# Patient Record
Sex: FEMALE | Race: BLACK OR AFRICAN AMERICAN | Employment: FULL TIME | URBAN - METROPOLITAN AREA
[De-identification: names, ages, dates, MRNs, and addresses within clinical notes are randomized per-mention and may not be internally consistent; named-entity substitution may affect disease eponyms.]

---

## 2019-07-08 DIAGNOSIS — R30.0 DYSURIA: ICD-10-CM

## 2019-07-08 DIAGNOSIS — R82.90 NONSPECIFIC FINDING ON EXAMINATION OF URINE: Primary | ICD-10-CM

## 2019-07-08 DIAGNOSIS — R30.0 DYSURIA: Primary | ICD-10-CM

## 2019-07-08 LAB
ALBUMIN UR-MCNC: 30 MG/DL
APPEARANCE UR: ABNORMAL
BACTERIA #/AREA URNS HPF: ABNORMAL /HPF
BILIRUB UR QL STRIP: NEGATIVE
COLOR UR AUTO: YELLOW
GLUCOSE UR STRIP-MCNC: >=1000 MG/DL
HGB UR QL STRIP: ABNORMAL
KETONES UR STRIP-MCNC: NEGATIVE MG/DL
LEUKOCYTE ESTERASE UR QL STRIP: ABNORMAL
NITRATE UR QL: NEGATIVE
PH UR STRIP: 6 PH (ref 5–7)
RBC #/AREA URNS AUTO: >100 /HPF
SOURCE: ABNORMAL
SP GR UR STRIP: 1.01 (ref 1–1.03)
UROBILINOGEN UR STRIP-ACNC: 0.2 EU/DL (ref 0.2–1)
WBC #/AREA URNS AUTO: >100 /HPF

## 2019-07-08 PROCEDURE — 87088 URINE BACTERIA CULTURE: CPT | Performed by: UROLOGY

## 2019-07-08 PROCEDURE — 81001 URINALYSIS AUTO W/SCOPE: CPT | Performed by: UROLOGY

## 2019-07-08 PROCEDURE — 87186 SC STD MICRODIL/AGAR DIL: CPT | Performed by: UROLOGY

## 2019-07-08 PROCEDURE — 87086 URINE CULTURE/COLONY COUNT: CPT | Performed by: UROLOGY

## 2019-07-10 LAB
BACTERIA SPEC CULT: ABNORMAL
SPECIMEN SOURCE: ABNORMAL

## 2019-07-31 ENCOUNTER — OFFICE VISIT (OUTPATIENT)
Dept: ENDOCRINOLOGY | Facility: CLINIC | Age: 39
End: 2019-07-31
Payer: COMMERCIAL

## 2019-07-31 VITALS
WEIGHT: 210 LBS | BODY MASS INDEX: 31.1 KG/M2 | HEIGHT: 69 IN | DIASTOLIC BLOOD PRESSURE: 98 MMHG | SYSTOLIC BLOOD PRESSURE: 142 MMHG

## 2019-07-31 DIAGNOSIS — E66.811 CLASS 1 OBESITY DUE TO EXCESS CALORIES WITH SERIOUS COMORBIDITY AND BODY MASS INDEX (BMI) OF 31.0 TO 31.9 IN ADULT: ICD-10-CM

## 2019-07-31 DIAGNOSIS — Z79.4 TYPE 2 DIABETES MELLITUS WITHOUT COMPLICATION, WITH LONG-TERM CURRENT USE OF INSULIN (H): Primary | ICD-10-CM

## 2019-07-31 DIAGNOSIS — E66.09 CLASS 1 OBESITY DUE TO EXCESS CALORIES WITH SERIOUS COMORBIDITY AND BODY MASS INDEX (BMI) OF 31.0 TO 31.9 IN ADULT: ICD-10-CM

## 2019-07-31 DIAGNOSIS — E11.9 TYPE 2 DIABETES MELLITUS WITHOUT COMPLICATION, WITH LONG-TERM CURRENT USE OF INSULIN (H): Primary | ICD-10-CM

## 2019-07-31 LAB
ALT SERPL W P-5'-P-CCNC: 19 U/L (ref 0–50)
ANION GAP SERPL CALCULATED.3IONS-SCNC: 8 MMOL/L (ref 3–14)
BUN SERPL-MCNC: 10 MG/DL (ref 7–30)
CALCIUM SERPL-MCNC: 9.2 MG/DL (ref 8.5–10.1)
CHLORIDE SERPL-SCNC: 99 MMOL/L (ref 94–109)
CO2 SERPL-SCNC: 25 MMOL/L (ref 20–32)
CREAT SERPL-MCNC: 0.84 MG/DL (ref 0.52–1.04)
GFR SERPL CREATININE-BSD FRML MDRD: 86 ML/MIN/{1.73_M2}
GLUCOSE SERPL-MCNC: 243 MG/DL (ref 70–99)
HBA1C MFR BLD: 11.2 % (ref 0–5.6)
POTASSIUM SERPL-SCNC: 3.7 MMOL/L (ref 3.4–5.3)
SODIUM SERPL-SCNC: 132 MMOL/L (ref 133–144)
TSH SERPL DL<=0.005 MIU/L-ACNC: 1.34 MU/L (ref 0.4–4)

## 2019-07-31 PROCEDURE — 84443 ASSAY THYROID STIM HORMONE: CPT | Performed by: INTERNAL MEDICINE

## 2019-07-31 PROCEDURE — 80048 BASIC METABOLIC PNL TOTAL CA: CPT | Performed by: INTERNAL MEDICINE

## 2019-07-31 PROCEDURE — 84681 ASSAY OF C-PEPTIDE: CPT | Performed by: INTERNAL MEDICINE

## 2019-07-31 PROCEDURE — 99204 OFFICE O/P NEW MOD 45 MIN: CPT | Performed by: INTERNAL MEDICINE

## 2019-07-31 PROCEDURE — 36415 COLL VENOUS BLD VENIPUNCTURE: CPT | Performed by: INTERNAL MEDICINE

## 2019-07-31 PROCEDURE — 83036 HEMOGLOBIN GLYCOSYLATED A1C: CPT | Performed by: INTERNAL MEDICINE

## 2019-07-31 PROCEDURE — 84460 ALANINE AMINO (ALT) (SGPT): CPT | Performed by: INTERNAL MEDICINE

## 2019-07-31 PROCEDURE — 82043 UR ALBUMIN QUANTITATIVE: CPT | Performed by: INTERNAL MEDICINE

## 2019-07-31 RX ORDER — BUDESONIDE AND FORMOTEROL FUMARATE DIHYDRATE 160; 4.5 UG/1; UG/1
AEROSOL RESPIRATORY (INHALATION)
COMMUNITY
Start: 2019-05-20

## 2019-07-31 RX ORDER — INSULIN ASPART 100 [IU]/ML
INJECTION, SOLUTION INTRAVENOUS; SUBCUTANEOUS
COMMUNITY
Start: 2019-07-19

## 2019-07-31 RX ORDER — ALBUTEROL SULFATE 90 UG/1
AEROSOL, METERED RESPIRATORY (INHALATION)
COMMUNITY
Start: 2019-05-10

## 2019-07-31 RX ORDER — INSULIN GLARGINE 100 [IU]/ML
60 INJECTION, SOLUTION SUBCUTANEOUS
COMMUNITY
Start: 2019-05-08 | End: 2020-04-06

## 2019-07-31 RX ORDER — TRIAMTERENE/HYDROCHLOROTHIAZID 37.5-25 MG
TABLET ORAL
COMMUNITY
Start: 2019-04-15 | End: 2019-11-12

## 2019-07-31 ASSESSMENT — MIFFLIN-ST. JEOR: SCORE: 1686.93

## 2019-07-31 NOTE — PATIENT INSTRUCTIONS
Continue diabetes medications as:   Metformin  1000 mg morning and evening   Novolog Flexpen 12U with regular meals, 15U with large carb meals      sscale 2U per 50 mg/dl 150 mg/dl   Lantus Solostar 64U morning and evening      Trulicity   Resume weekly dose routine      Monitor for GI symptoms, skin rash/hives    Test blood glucose levels more often  Plan to see the [facility] Diabetes Educator   Referral placed   Call [phone] to make an appointment   Plan to wear diagnostic Shellie sensor for 1 week   Consider purchase of own Dexcom vs Shellie sensor    Future followup endocrinology evaluation with Dr. Bryant in 9/2019    I will be reassigned from the [facility] to the [facility] starting in August 2019.  I will no longer be seeing patients in [place] after August 1, 2019.  My work schedule:  [facility] -Monday, Tuesday, Friday and [facility]- Wednesdays    A new endocrinologist (Dr. Bryant) started working at [facility] and [facility], starting in late July 2019.  You may schedule future appointments with the new endocrinologist or travel to the [facility] or [facility] to see me for evaluations.   Appointment scheduling:  [facility]    [phone]  [facility]):  [phone]  [facility]   [phone]  [facility]): [phone]

## 2019-07-31 NOTE — PROGRESS NOTES
Name: Laura Sexton is a 38 year old woman, self referred for evaluation of     Chief Complaint   Patient presents with     Diabetes     HPI:  Recent issues:  Here for evaluation of diabetes.  She works at  [employer], learned of my endocrinology practice here  Reviewed medical history from patient and [facility] chart record        . Diagnosed with GDM, age 14  During pregnancy, diet management without insulin  Delivered 1994, daughter birthweight 7#11oz    . Diagnosed with diabetes mellitus  Began insulin treatment, has taken since  Has also used diabetes pills as metformin, also glimeparide   Recalls having low BG on glimeparide in the past  Subsequent 5 more pregnancies though 2 , daughter [id number], son born 2005  Insulin meds have included Humalog, Novolog, Lantus    Had previously seen Dr. Franky Engel/C  Treatment with Lantus daily and mealtime Novolog, metformin  Tried Bidureon- had hives, may have tried Trulicity- hives    Has not worn a CGMS sensor in the past  Current DM medications:   Metformin  1000 mg morning and evening   Novolog Flexpen 10 units with meals      sscale 2U per 50 mg/dl 150 mg/dl   Lantus Solostar 60 units morning and evening     Blood glucose (BG) meter Accu-Check Nikky Plus   Tests 1-2x/day   Recent BG trends high 200's vs low 300's    Previous [facility] labs include:        Previous labs at PCP clinic in 2019. hgbA1c 15.3%  2019. hgbA1c 11%    Recent  labs include:  Lab Results   Component Value Date    A1C 6.4 (H) 10/07/2005    POTASSIUM 3.9 10/07/2005     (H) 10/07/2005    CHOL 206 (H) 10/07/2005    TRIG 133 10/07/2005    HDL 38 (L) 10/07/2005     (H) 10/07/2005    UCRR 173 2005    MICROL 12 2005    UMALCR 7.11 2005    TSH 0.98 10/07/2005     Last eye exam 3/2019, no DR per patient but early signs of glaucoma  DM Complications:   Nephropathy    Elevated urine micral 2019 when not taking her insulin and high BG  trends    Recent symptoms:   Some fatigue, mild weight gain   Minimal menstrual cycles with IUD in place  Other chronic illnesses include:   Hypertension:   Takes Maxide med, followed by PCP   Asthma:    Takes Ventolin, Symbicort meds, followed by PCP   Irreg menses:  Has Mirena IUD, followed by PCP or GYN      Single, lives with 14 yr old son in [place]  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 Zonia Hameed/[facility] for general medicine evaluations.    PMH/PSH:  Past Medical History:   Diagnosis Date     Depression      Hirsutism      Hypertension      Obesity      Type 2 diabetes mellitus (H)      Uncontrolled type 2 diabetes mellitus (H)      No past surgical history on file.    Family Hx:  No family history on file.      Social Hx:  Social History     Socioeconomic History     Marital status: Single     Spouse name: Not on file     Number of children: Not on file     Years of education: Not on file     Highest education level: Not on file   Occupational History     Not on file   Social Needs     Financial resource strain: Not on file     Food insecurity:     Worry: Not on file     Inability: Not on file     Transportation needs:     Medical: Not on file     Non-medical: Not on file   Tobacco Use     Smoking status: Never Smoker     Smokeless tobacco: Never Used   Substance and Sexual Activity     Alcohol use: Not on file     Drug use: Not on file     Sexual activity: Not on file   Lifestyle     Physical activity:     Days per week: Not on file     Minutes per session: Not on file     Stress: Not on file   Relationships     Social connections:     Talks on phone: Not on file     Gets together: Not on file     Attends Congregation service: Not on file     Active member of club or organization: Not on file     Attends meetings of clubs or organizations: Not on file     Relationship status: Not on file     Intimate partner violence:     Fear of current or ex partner: Not on file     Emotionally  "abused: Not on file     Physically abused: Not on file     Forced sexual activity: Not on file   Other Topics Concern     Not on file   Social History Narrative     Not on file          MEDICATIONS:  has a current medication list which includes the following prescription(s): lantus solostar, levonorgestrel, metformin, novolog flexpen, symbicort, triamterene-hctz, and ventolin hfa.    ROS:     ROS: 10 point ROS neg other than the symptoms noted above in the HPI.    GENERAL: mild fatigue and wt gain; denies fevers, chills, night sweats.   HEENT: no dysphagia, odonophagia, diplopia, neck pain  THYROID:  no apparent hyper or hypothyroid symptoms  CV: no chest pain, pressure, palpitations  LUNGS: no SOB, VARELA, cough, wheezing   ABDOMEN: no diarrhea, constipation, abdominal pain  EXTREMITIES: no rashes, ulcers, edema  NEUROLOGY: no headaches, denies changes in vision, tingling, extremitiy numbness   MSK: no muscle aches or pains, weakness  SKIN: no rashes or lesions  : irreg menses with light flow, has IUD  PSYCH:  stable mood, no significant anxiety or depression  ENDOCRINE: no heat or cold intolerance    Physical Exam   VS: BP (!) 142/98   Ht 1.753 m (5' 9\")   Wt 95.3 kg (210 lb)   BMI 31.01 kg/m    GENERAL: AXOX3, NAD, well dressed, answering questions appropriately, appears stated age.  THYROID:  normal gland, no apparent nodules or goiter  HEENT: neck non-tender, no exopthalmous, no proptosis, EOMI  CV: RRR, no rubs, gallops, no murmurs  LUNGS: CTAB, no wheezes, rales, or ronchi  ABDOMEN: mildly obese, soft, nontender, nondistended  EXTREMITIES: no edema, +pedal pulses, no feet skin lesions  NEUROLOGY: CN grossly intact, no tremors  MSK: grossly intact  SKIN: dark skin complexion, long scalp hair; no rashes, no lesions    LABS:    All pertinent notes, labs, and images personally reviewed by me.     A/P:  Encounter Diagnoses   Name Primary?     Type 2 diabetes mellitus without complication, with long-term current " use of insulin (H) Yes     Class 1 obesity due to excess calories with serious comorbidity and body mass index (BMI) of 31.0 to 31.9 in adult        Comments:  Reviewed health history and diabetes issues.  Difficult to assess recent PCP eval and lab testing without records available    Plan:  Discussed general issues with the diabetes diagnosis and management  Reviewed the pathophysiology of T2DM  We discussed the hgbA1c test which reflects previous overall glucose levels or control  Discussed the importance of blood glucose (BG) testing to assess glucose trends  Provided general overview of the diabetes medication options and medication treatment plan.    Recommend:  Continue current Novolog, Lantus, and metformin med use, as noted   Needs higher mealtime base-dose and use of sscale   Raise basal insulin doses also  Continue metformin, retry Trulicity   Reviewed Trulicity med and pen dosing technique   Could also consider switch to Victoza daily (her mother uses this) or Ozempic weekly  Consider change from Lantus BID to Tresiba U200 every day  Test BG levels more often, to allow more accurate Nv dosing and Nv sscale  Goal target BG  mg/dl, or could try 100-175 mg/dl  Advise CDE evaluation, [facility] Referral placed   Wear diagnostic CGMS, then f/u eval to review and adjust MDI plan   Consider acquiring own CGMS- DexcomG6 would be ideal  Need to reassess urine micral ratio, given her history of microalbuminuria  Keep focus on diet, exercise, weight management.  Advise having fasting lipid panel testing and dilated eye examination, at least annually    Laura to follow up with Primary Care provider regarding elevated blood pressure.  Addressed patient questions today    Patient Instructions   Continue diabetes medications as:   Metformin  1000 mg morning and evening   Novolog Flexpen 12U with regular meals, 15U with large carb meals      sscale 2U per 50 mg/dl 150 mg/dl   Lantus Solostar 64U morning and  evening      Trulicity   Resume weekly dose routine      Monitor for GI symptoms, skin rash/hives    Test blood glucose levels more often  Plan to see the [facility] Diabetes Educator   Referral placed   Call [phone] to make an appointment   Plan to wear diagnostic Shellie sensor for 1 week   Consider purchase of own Dexcom vs Shellie sensor    Future followup endocrinology evaluation with Dr. Bryant in 9/2019    I will be reassigned from the [facility] to the [facility] starting in August 2019.  I will no longer be seeing patients in [place] after August 1, 2019.  My work schedule:  [facility] -Monday, Tuesday, Friday and [facility]- Wednesdays    A new endocrinologist (Dr. Bryant) started working at [facility] and [facility], starting in late July 2019.  You may schedule future appointments with the new endocrinologist or travel to the [facility] or [facility] to see me for evaluations.   Appointment scheduling:  [facility]    [phone]  [facility]):  [phone]  [facility]   [phone]  [facility]): [phone]        Labs ordered today:   Orders Placed This Encounter   Procedures     Hemoglobin A1c     ALT     Basic metabolic panel     C-peptide     TSH     Albumin Random Urine Quantitative with Creat Ratio     DIABETES EDUCATOR REFERRAL     Radiology/Consults ordered today: DIABETES EDUCATOR REFERRAL    More than 50% of the time spent with Ms. Sexton on counseling / coordinating her care.  Total appointment time was 50 minutes.    Follow-up:  9/2019 at [facility] or [facility]    Saw Cummings MD  [facility]  CC: Zonia Hameed

## 2019-08-01 LAB
C PEPTIDE SERPL-MCNC: 2.3 NG/ML (ref 0.9–6.9)
CREAT UR-MCNC: 119 MG/DL
MICROALBUMIN UR-MCNC: 35 MG/L
MICROALBUMIN/CREAT UR: 29.16 MG/G CR (ref 0–25)

## 2019-08-23 ENCOUNTER — ALLIED HEALTH/NURSE VISIT (OUTPATIENT)
Dept: EDUCATION SERVICES | Facility: CLINIC | Age: 39
End: 2019-08-23
Payer: COMMERCIAL

## 2019-08-23 DIAGNOSIS — E11.9 DIABETES MELLITUS (H): Primary | ICD-10-CM

## 2019-08-23 PROCEDURE — 95250 CONT GLUC MNTR PHYS/QHP EQP: CPT

## 2019-08-23 PROCEDURE — G0108 DIAB MANAGE TRN  PER INDIV: HCPCS

## 2019-08-23 NOTE — PATIENT INSTRUCTIONS
Trulicity- take 0.75mg weekly for 4 weeks then if you don't have any side effects request to go up to 1.5mg weekly.    1. Plan to wear the LibrePro sensor for 14 days. It is okay to shower, bathe, and swim (up to 3 feet deep for 30 minutes)    2. Continue with your usual diabetes care plan - check blood sugars and take medicines, as prescribed.    3. Keep a log of what you eat and drink, when you take your medications and how much you take, and exercise you do while you are wearing the sensor.    3. Do not cover the sensor with extra adhesive (the small hole in the center of the sensor must remain uncovered)    4. Use a little extra care, especially when getting dressed or exercising, to avoid accidentally loosening or removing the sensor.     5. Remove the sensor if you need to have an MRI or CT scan.     Return the sensor to the [facility] on 8/30/19.    Follow-up appointment: 8/30 and 9/6    [facility] for the [facility] Area:  For Your Diabetes Education and Nutrition Appointments Call:  [phone]   For Diabetes Education or Nutrition Related Questions:   Phone: [phone]  E-mail: [email or web address]  Fax: [phone]   If you need a medication refill please contact your pharmacy. Please allow 3 business days for your refills to be completed.    Instructions for emailing the Diabetes Educators    If you need to communicate a non-urgent message to a Diabetes Educator via email, please send to [email or web address].    Please follow the following email guidelines:    Subject line: Secure: your clinic name (example: Secure: [facility])  In the email please include: First name, middle initial, last name and date of birth.    We will be in touch with you within one (1) business day.

## 2019-08-23 NOTE — PROGRESS NOTES
Diabetes Self-Management Education & Support      Diabetes Self-Management Education & Support - Professional CGM Insertion    SUBJECTIVE/OBJECTIVE  Presents for: Individual review  Accompanied by: Self  Diabetes education in the past 24mo: No  Focus of Visit: CGM  Diabetes type: Type 2  Disease course: Worsening  How confident are you filling out medical forms by yourself:: Extremely  Diabetes management related comments/concerns: My insurance said I need a PA for the personal one.  Transportation concerns: No  Other concerns:: None  Cultural Influences/Ethnic Background:  -AMERICAN    Patient seen today for Professional CGM Insertion:    Professional CGM Insertion  Sensor Type: LibrePro  Lot #: [id number]  Serial #: [id number]  Expiration Date: 09/30/19  Indication(s) for CGM Study: Unexplained fluctuations in glucose values, Difficult to manage hypoglycemia and/or hyperglycemia       Healthy Eating  Healthy Eating Assessed Today: No    Being Active       Monitoring  Monitoring Assessed Today: Yes  Did patient bring glucose meter to appointment? : No  Overall Range (mg/dL): >200      Taking Medications  Diabetes Medication(s)     Biguanides       metFORMIN (GLUCOPHAGE) 1000 MG tablet    Take 1,000 mg by mouth 2 times daily (with meals)     Insulin       LANTUS SOLOSTAR 100 UNIT/ML soln    64 units twice daily      NOVOLOG FLEXPEN 100 UNIT/ML soln    12 units small meal, 15 units large meal        Trulicity .75 mg weekly    Taking Medication Assessed Today: Yes  Current Treatments: Insulin Injections, Non-insulin Injectables, Oral Agent (monotherapy)  Dose schedule: at bedtime, pre-breakfast  Given by: Patient  Injection/Infusion sites: Abdomen  Problems taking diabetes medications regularly?: Yes(Large time frame fluctuation for Lantus dosing)  Diabetes medication side effects?: No  Treatment Compliance: Most of the time    Problem Solving  Problem Solving Assessed Today: Yes  Hypoglycemia Frequency:  Rarely(Felt low at 92)  Hypoglycemia Treatment: Candy    Hypoglycemia symptoms  Dizziness or Light-Headedness: Yes  Headaches: Yes    Reducing Risks  Reducing Risks Assessed Today: No    Healthy Coping  Informal Support system:: Family  Stage of change: ACTION (Actively working towards change)  Difficulty affording diabetes management supplies?: No  Patient Activation Measure Survey Score:  No flowsheet data found.      ASSESSMENT  Patient report large time range when she takes her lantus doses.  Sometimes takes evening dose at 1am and morning dose at 6 OR 7am.  Patient would benefit from trying to take doses 12 hours apart.  If this is not possible consistently could try switching to Tresiba u200 insulin for injection time variability. Patient is interested in the Freestyle Shellie personal CGM after Professional version is completed.        INTERVENTION:   Diabetes knowledge and skills assessment:     Patient is knowledgeable in diabetes management concepts related to: Monitoring    Patient needs further education on the following diabetes management concepts: Taking Medication    Based on learning assessment above, most appropriate setting for further diabetes education would be: Individual setting.    Education provided today on:  AADE Self-Care Behaviors:  Taking Medication: action of prescribed medication and profile of Lantus insulin.  Used action times chart to show how lantus works and what happens if doses are too close or too far apart.  Discussed Tresiba insulin and flexibility of injection timing.      Monitoring: showed basics of personal shellie and what information she would get.     WRITTEN AND VERBAL INFORMATION GIVEN TO SUPPORT UNDERSTANDING OF:   LibrePro CGM: Sensor insertion, intention of monitoring for 14 days. Keep records of BG, food intake, exercise, and medication dosing during wear.     Opportunities for ongoing education and support in diabetes-self management were discussed.    Pt  verbalized understanding of concepts discussed and recommendations provided today.       Education Materials Provided:  Food record       PLAN  See Patient Instructions for co-developed, patient-stated behavior change goals.  AVS printed and provided to patient today. See Follow-Up section for recommended follow-up.    Follow up planned in 1 and 2 weeks.     Elana Villela MS, RD, LD, CDE    Time Spent: 35 minutes  Encounter Type: Individual    Any diabetes medication dose changes were made via the CDE Protocol and Collaborative Practice Agreement with the patient's endocrinology provider. A copy of this encounter was shared with the provider.      Lantus 11-1am

## 2019-09-17 ENCOUNTER — PATIENT OUTREACH (OUTPATIENT)
Dept: EDUCATION SERVICES | Facility: CLINIC | Age: 39
End: 2019-09-17

## 2019-09-17 ENCOUNTER — ALLIED HEALTH/NURSE VISIT (OUTPATIENT)
Dept: EDUCATION SERVICES | Facility: CLINIC | Age: 39
End: 2019-09-17
Payer: COMMERCIAL

## 2019-09-17 DIAGNOSIS — E11.9 DIABETES MELLITUS (H): Primary | ICD-10-CM

## 2019-09-17 DIAGNOSIS — E11.9 TYPE 2 DIABETES MELLITUS WITHOUT COMPLICATION, WITH LONG-TERM CURRENT USE OF INSULIN (H): ICD-10-CM

## 2019-09-17 DIAGNOSIS — Z79.4 TYPE 2 DIABETES MELLITUS WITHOUT COMPLICATION, WITH LONG-TERM CURRENT USE OF INSULIN (H): ICD-10-CM

## 2019-09-17 PROCEDURE — 99207 ZZC NO CHARGE LOS: CPT

## 2019-09-17 RX ORDER — FLASH GLUCOSE SCANNING READER
1 EACH MISCELLANEOUS CONTINUOUS
Qty: 1 DEVICE | Refills: 0 | Status: SHIPPED | OUTPATIENT
Start: 2019-09-17

## 2019-09-17 RX ORDER — FLASH GLUCOSE SENSOR
1 KIT MISCELLANEOUS
Qty: 2 EACH | Refills: 11 | Status: SHIPPED | OUTPATIENT
Start: 2019-09-17 | End: 2020-02-05

## 2019-09-17 NOTE — Clinical Note
TRINI cgm complete.  Ok to bill, but due to time we had a short visit.  Recommend personal CGM will route orders.  She plans to follow up with Dr. Bryant in 1-2 months.Thanks!Elana

## 2019-09-17 NOTE — PROGRESS NOTES
Patient interested in Freestyle Shellie CGM for blood sugar monitoring.  Please sign if you agree.    Elana Villela MS, RD, LD, CDE

## 2019-09-17 NOTE — PROGRESS NOTES
Diabetes Self-Management Education & Support      Diabetes Education Self-Management & Training: Follow-up and Continuous Glucose Monitor Download    Laurabucky Sexton presents today for download and report review of Professional continuous glucose monitor device.      Current Diabetes Management per Patient:  Diabetes Medication(s)     Biguanides       metFORMIN (GLUCOPHAGE) 1000 MG tablet    Take 1,000 mg by mouth 2 times daily (with meals)     Insulin       LANTUS SOLOSTAR 100 UNIT/ML soln    60 Units      NOVOLOG FLEXPEN 100 UNIT/ML soln          Trulicity- 0.75mg      Most Recent A1c Result:    Lab Results   Component Value Date    A1C 11.2 07/31/2019       Continuous Glucose Monitor Interpretation     Reports:          8/25- patient took first dose of 0.75 Trulicity, felt sick all week didn't take 2nd week dose.    Consistent day-to-day patterns: Pattern of daytime hyperglycemia, Pattern of nocturnal hyperglycemia       Healthy Coping     Patient Activation Measure Survey Score:  No flowsheet data found.      Assessment:  Medication and/or insulin dosing is: accurate    Patient is frequently taking 15 units novolog plus correction.  Blood sugars were significantly lower with 1 week dose of trulicity, but she additionally didn't eat.  She was unsure if this was illness OR the medication.        INTERVENTION:   Diabetes knowledge and skills assessment:     Patient is knowledgeable in diabetes management concepts related to: Problem Solving    Patient needs further education on the following diabetes management concepts: Healthy Eating, Being Active, Monitoring and Taking Medication    Based on learning assessment above, most appropriate setting for further diabetes education would be: Individual setting.    Education provided today on:  AADE Self-Care Behaviors:  Monitoring: Discussed getting personal sensor to better understand her day to day blood sugars.  Discussed that money frequently dictates her  decisions.  She would be interested in trying personal CGM for a few months while she has met her deductible then re-assess in a few months.    Taking Medication: Discussed that current blood sugars spikes are likely linked to mis match between carbohydrates and insulin.  Discussed that clearly trulicity impacted blood sugars, she is willing to get it a 2nd try.    CGM-specific education:   Use of trends and graphs for pattern management and problem solving    Pt verbalized understanding of concepts discussed and recommendations provided today.       Education Materials Provided:  No new materials provided today    PLAN:  See Patient Instructions for co-developed, patient-stated behavior change goals.  AVS printed and provided to patient today. See Follow-Up section for recommended follow-up.    Recommend Freestyle Shellie personal CGM and 2nd trial of consistent trulicity.    Recommend follow up with Endocrinology 1 month after re-starting medication and starting CGM.    Elana Villela MS, RD, LD, CDE    Time Spent: 15 minutes  Encounter Type: Individual    Any diabetes medication dose changes were made via the CDE Protocol and Collaborative Practice Agreement with the patient's endocrinology provider. A copy of this encounter was shared with the provider.

## 2019-11-12 ENCOUNTER — OFFICE VISIT (OUTPATIENT)
Dept: ENDOCRINOLOGY | Facility: CLINIC | Age: 39
End: 2019-11-12
Payer: COMMERCIAL

## 2019-11-12 VITALS
BODY MASS INDEX: 32.1 KG/M2 | DIASTOLIC BLOOD PRESSURE: 113 MMHG | SYSTOLIC BLOOD PRESSURE: 161 MMHG | WEIGHT: 217.4 LBS | OXYGEN SATURATION: 99 % | HEART RATE: 103 BPM

## 2019-11-12 DIAGNOSIS — Z79.4 TYPE 2 DIABETES MELLITUS WITH HYPERGLYCEMIA, WITH LONG-TERM CURRENT USE OF INSULIN (H): ICD-10-CM

## 2019-11-12 DIAGNOSIS — E78.5 DYSLIPIDEMIA: ICD-10-CM

## 2019-11-12 DIAGNOSIS — E11.65 TYPE 2 DIABETES MELLITUS WITH HYPERGLYCEMIA, WITH LONG-TERM CURRENT USE OF INSULIN (H): ICD-10-CM

## 2019-11-12 DIAGNOSIS — Z79.4 TYPE 2 DIABETES MELLITUS WITHOUT COMPLICATION, WITH LONG-TERM CURRENT USE OF INSULIN (H): Primary | ICD-10-CM

## 2019-11-12 DIAGNOSIS — I10 ESSENTIAL HYPERTENSION: ICD-10-CM

## 2019-11-12 DIAGNOSIS — E11.9 TYPE 2 DIABETES MELLITUS WITHOUT COMPLICATION, WITH LONG-TERM CURRENT USE OF INSULIN (H): Primary | ICD-10-CM

## 2019-11-12 LAB
ANION GAP SERPL CALCULATED.3IONS-SCNC: 9 MMOL/L (ref 3–14)
BUN SERPL-MCNC: 15 MG/DL (ref 7–30)
CALCIUM SERPL-MCNC: 9.4 MG/DL (ref 8.5–10.1)
CHLORIDE SERPL-SCNC: 99 MMOL/L (ref 94–109)
CHOLEST SERPL-MCNC: 208 MG/DL
CO2 SERPL-SCNC: 24 MMOL/L (ref 20–32)
CREAT SERPL-MCNC: 0.87 MG/DL (ref 0.52–1.04)
CREAT UR-MCNC: 215 MG/DL
GFR SERPL CREATININE-BSD FRML MDRD: 82 ML/MIN/{1.73_M2}
GLUCOSE SERPL-MCNC: 215 MG/DL (ref 70–99)
HBA1C MFR BLD: 10.4 % (ref 0–5.6)
HDLC SERPL-MCNC: 40 MG/DL
LDLC SERPL CALC-MCNC: 107 MG/DL
MICROALBUMIN UR-MCNC: 468 MG/L
MICROALBUMIN/CREAT UR: 217.67 MG/G CR (ref 0–25)
NONHDLC SERPL-MCNC: 168 MG/DL
POTASSIUM SERPL-SCNC: 3.9 MMOL/L (ref 3.4–5.3)
SODIUM SERPL-SCNC: 132 MMOL/L (ref 133–144)
TRIGL SERPL-MCNC: 306 MG/DL

## 2019-11-12 PROCEDURE — 80048 BASIC METABOLIC PNL TOTAL CA: CPT | Performed by: INTERNAL MEDICINE

## 2019-11-12 PROCEDURE — 82043 UR ALBUMIN QUANTITATIVE: CPT | Performed by: INTERNAL MEDICINE

## 2019-11-12 PROCEDURE — 99214 OFFICE O/P EST MOD 30 MIN: CPT | Performed by: INTERNAL MEDICINE

## 2019-11-12 PROCEDURE — 36415 COLL VENOUS BLD VENIPUNCTURE: CPT | Performed by: INTERNAL MEDICINE

## 2019-11-12 PROCEDURE — 83036 HEMOGLOBIN GLYCOSYLATED A1C: CPT | Performed by: INTERNAL MEDICINE

## 2019-11-12 PROCEDURE — 80061 LIPID PANEL: CPT | Performed by: INTERNAL MEDICINE

## 2019-11-12 RX ORDER — LOSARTAN POTASSIUM AND HYDROCHLOROTHIAZIDE 25; 100 MG/1; MG/1
1 TABLET ORAL DAILY
Qty: 30 TABLET | Refills: 11 | Status: SHIPPED | OUTPATIENT
Start: 2019-11-12 | End: 2020-02-05

## 2019-11-12 NOTE — LETTER
"    11/12/2019         RE: Laura Sexton  [address]  [place] [address]        Dear Colleague,    Thank you for referring your patient, Laura Sexton, to the [facility]. Please see a copy of my visit note below.    S: Pt being seen in f/u for DM.  Previously seen by Dr Cummings:  \"1994. Diagnosed with GDM, age 14  During pregnancy, diet management without insulin  Delivered 9/1994, daughter birthweight 7#11oz     1995. Diagnosed with diabetes mellitus  Began insulin treatment, has taken since  Has also used diabetes pills as metformin, also glimeparide             Recalls having low BG on glimeparide in the past  Subsequent 5 more pregnancies though 2 MC, daughter [id number], son born 6/2005  Insulin meds have included Humalog, Novolog, Lantus     Had previously seen Dr. Franky Engel/[facility]  Treatment with Lantus daily and mealtime Novolog, metformin  Tried Bidureon- had hives, may have tried Trulicity- hives\"    She has missed her last two doses of Trulicity. Ran out.   Stopped BP medication 2 months ago as she did not notice much difference in the readings and felt it was causing edema.     Metformin 1000 mg BID  Lantus 64 U BID  Novolog 12-15 U plus 2u/50 sliding scale insulin    No set carb goal for meals.     CGM:  Avg 303, SD 84.7  8% of time in range.     She has become acclimated to running high and gets symptoms of hypoglycemia with readings in 150's.     ROS: 10 point ROS neg other than the symptoms noted above in the HPI.    O:  Vital signs:      BP: (!) 161/113 Pulse: 103     SpO2: 99 %       Weight: 98.6 kg (217 lb 6.4 oz)  Estimated body mass index is 32.1 kg/m  as calculated from the following:    Height as of 7/31/19: 1.753 m (5' 9\").    Weight as of this encounter: 98.6 kg (217 lb 6.4 oz).  Gen: In NAD.   HEENT: no proptosis or lid lag, EOMI, MMM.     A/P:   Type 2 DM - Uncontrolled. Discussed SGLT-2 inhibitors. ICR 4, sensitivity 13. She has become acclimated to " running high and gets symptoms of hypoglycemia with readings in 150's.   -Make sure to scan Shellie every 8 hours.   -Rx for trulicity sent.   -No change to lantus dose today.   -Instead of take 12-15 units of take 4 units for every 15 grams of carbs.   -Change sliding scale from 2 units for every 50 points to 3 units for every 50 points.   -ASA not indicated.  -BP: elevated. Off medication for 2 months.    -Start hydrochlorothiazide-losartan.    -Send me an update on your blood pressure in 2 weeks.   -Lipids: due. Statin not using currently.   -Microalbumin 29.16 in 7/2019. Allergy to lisinopril and amlodipine.   -Eyes: reports normal exam in 2019.   -Smoking: none.     I spent 25 minutes with the patient. Greater than 50% of the time spent in . Risks/benefits/alternatives reviewed.     Harvey Bryant MD on 11/12/2019 at 12:44 PM          Again, thank you for allowing me to participate in the care of your patient.        Sincerely,        Harevy Bryant MD

## 2019-11-12 NOTE — PROGRESS NOTES
"S: Pt being seen in f/u for DM.  Previously seen by Dr Cummings:  \"1994. Diagnosed with GDM, age 14  During pregnancy, diet management without insulin  Delivered 9/1994, daughter birthweight 7#11oz     1995. Diagnosed with diabetes mellitus  Began insulin treatment, has taken since  Has also used diabetes pills as metformin, also glimeparide             Recalls having low BG on glimeparide in the past  Subsequent 5 more pregnancies though 2 MC, daughter [id number], son born 6/2005  Insulin meds have included Humalog, Novolog, Lantus     Had previously seen Dr. Franky Engel/[facility]  Treatment with Lantus daily and mealtime Novolog, metformin  Tried Bidureon- had hives, may have tried Trulicity- hives\"    She has missed her last two doses of Trulicity. Ran out.   Stopped BP medication 2 months ago as she did not notice much difference in the readings and felt it was causing edema.     Metformin 1000 mg BID  Lantus 64 U BID  Novolog 12-15 U plus 2u/50 sliding scale insulin    No set carb goal for meals.     CGM:  Avg 303, SD 84.7  8% of time in range.     She has become acclimated to running high and gets symptoms of hypoglycemia with readings in 150's.     ROS: 10 point ROS neg other than the symptoms noted above in the HPI.    O:  Vital signs:      BP: (!) 161/113 Pulse: 103     SpO2: 99 %       Weight: 98.6 kg (217 lb 6.4 oz)  Estimated body mass index is 32.1 kg/m  as calculated from the following:    Height as of 7/31/19: 1.753 m (5' 9\").    Weight as of this encounter: 98.6 kg (217 lb 6.4 oz).  Gen: In NAD.   HEENT: no proptosis or lid lag, EOMI, MMM.     A/P:   Type 2 DM - Uncontrolled. Discussed SGLT-2 inhibitors. ICR 4, sensitivity 13. She has become acclimated to running high and gets symptoms of hypoglycemia with readings in 150's.   -Make sure to scan Shellie every 8 hours.   -Rx for trulicity sent.   -No change to lantus dose today.   -Instead of take 12-15 units of take 4 units for every 15 grams of carbs. "   -Change sliding scale from 2 units for every 50 points to 3 units for every 50 points.   -ASA not indicated.  -BP: elevated. Off medication for 2 months.    -Start hydrochlorothiazide-losartan.    -Send me an update on your blood pressure in 2 weeks.   -Lipids: due. Statin not using currently.   -Microalbumin 29.16 in 7/2019. Allergy to lisinopril and amlodipine.   -Eyes: reports normal exam in 2019.   -Smoking: none.     I spent 25 minutes with the patient. Greater than 50% of the time spent in . Risks/benefits/alternatives reviewed.     Harvey Bryant MD on 11/12/2019 at 12:44 PM

## 2019-11-12 NOTE — PATIENT INSTRUCTIONS
-Start hydrochlorothiazide-losartan.   -Send me an update on your blood pressure in 2 weeks.     -Make sure to scan Shellie every 8 hours.   *Shellie download in 2 weeks*    -Rx for trulicity sent.     -No change to lantus dose today.     -Instead of taking 12-15 units of take 4 units for every 15 grams of carbs.   -Change sliding scale from 2 units for every 50 points to 3 units for every 50 points.

## 2019-12-05 DIAGNOSIS — I10 ESSENTIAL HYPERTENSION: Primary | ICD-10-CM

## 2019-12-05 RX ORDER — TRIAMTERENE/HYDROCHLOROTHIAZID 37.5-25 MG
1 TABLET ORAL DAILY
Qty: 30 TABLET | Refills: 11 | Status: SHIPPED | OUTPATIENT
Start: 2019-12-05

## 2020-02-05 ENCOUNTER — OFFICE VISIT (OUTPATIENT)
Dept: ENDOCRINOLOGY | Facility: CLINIC | Age: 40
End: 2020-02-05
Payer: COMMERCIAL

## 2020-02-05 VITALS
BODY MASS INDEX: 33 KG/M2 | TEMPERATURE: 96.9 F | HEIGHT: 69 IN | HEART RATE: 80 BPM | OXYGEN SATURATION: 98 % | SYSTOLIC BLOOD PRESSURE: 146 MMHG | WEIGHT: 222.8 LBS | DIASTOLIC BLOOD PRESSURE: 96 MMHG | RESPIRATION RATE: 14 BRPM

## 2020-02-05 DIAGNOSIS — E11.9 TYPE 2 DIABETES MELLITUS WITHOUT COMPLICATION, WITH LONG-TERM CURRENT USE OF INSULIN (H): ICD-10-CM

## 2020-02-05 DIAGNOSIS — E78.5 DYSLIPIDEMIA: Primary | ICD-10-CM

## 2020-02-05 DIAGNOSIS — Z79.4 TYPE 2 DIABETES MELLITUS WITHOUT COMPLICATION, WITH LONG-TERM CURRENT USE OF INSULIN (H): ICD-10-CM

## 2020-02-05 DIAGNOSIS — I10 ESSENTIAL HYPERTENSION: ICD-10-CM

## 2020-02-05 LAB — HBA1C MFR BLD: 8.7 % (ref 0–5.6)

## 2020-02-05 PROCEDURE — 80048 BASIC METABOLIC PNL TOTAL CA: CPT | Performed by: INTERNAL MEDICINE

## 2020-02-05 PROCEDURE — 36415 COLL VENOUS BLD VENIPUNCTURE: CPT | Performed by: INTERNAL MEDICINE

## 2020-02-05 PROCEDURE — 99214 OFFICE O/P EST MOD 30 MIN: CPT | Performed by: INTERNAL MEDICINE

## 2020-02-05 PROCEDURE — 83036 HEMOGLOBIN GLYCOSYLATED A1C: CPT | Performed by: INTERNAL MEDICINE

## 2020-02-05 RX ORDER — FLASH GLUCOSE SENSOR
1 KIT MISCELLANEOUS
Qty: 6 EACH | Refills: 3 | Status: SHIPPED | OUTPATIENT
Start: 2020-02-05 | End: 2021-01-19

## 2020-02-05 RX ORDER — TRIAMTERENE AND HYDROCHLOROTHIAZIDE 75; 50 MG/1; MG/1
1 TABLET ORAL DAILY
Qty: 90 TABLET | Refills: 3 | Status: SHIPPED | OUTPATIENT
Start: 2020-02-05

## 2020-02-05 ASSESSMENT — MIFFLIN-ST. JEOR: SCORE: 1739.99

## 2020-02-05 NOTE — NURSING NOTE
"Chief Complaint   Patient presents with     Diabetes     recheck       Initial BP (!) 149/108 (BP Location: Left arm, Patient Position: Sitting, Cuff Size: Adult Large)   Pulse 80   Temp 96.9  F (36.1  C) (Oral)   Resp 14   Ht 1.753 m (5' 9\")   Wt 101.1 kg (222 lb 12.8 oz)   SpO2 98%   BMI 32.90 kg/m   Estimated body mass index is 32.9 kg/m  as calculated from the following:    Height as of this encounter: 1.753 m (5' 9\").    Weight as of this encounter: 101.1 kg (222 lb 12.8 oz).  BP completed using cuff size: large  Medications and allergies reviewed.      Lisa SEARS MA    "

## 2020-02-05 NOTE — LETTER
"    2/5/2020         RE: Laura Sexton  [address]  [facility] [address]        Dear Colleague,    Thank you for referring your patient, Laura Sexton, to the [facility]. Please see a copy of my visit note below.    S: Pt being seen in f/u for DM.  Previously seen by Dr Cummings:  \"1994. Diagnosed with GDM, age 14  During pregnancy, diet management without insulin  Delivered 9/1994, daughter birthweight 7#11oz     1995. Diagnosed with diabetes mellitus  Began insulin treatment, has taken since  Has also used diabetes pills as metformin, also glimeparide             Recalls having low BG on glimeparide in the past  Subsequent 5 more pregnancies though 2 MC, daughter [id number], son born 6/2005  Insulin meds have included Humalog, Novolog, Lantus     Had previously seen Dr. Franky Engel/[facility]  Treatment with Lantus daily and mealtime Novolog, metformin  Tried Bidureon- had hives, may have tried Trulicity- hives\"    Metformin 1000 mg BID  Lantus 63 U BID  Novolog 4U/15 grams plus 3U/50 sliding scale insulin  trulicity 0.75 mg once a week.     No regular exercise program.   Plans to start weights, boxing, and biking when the snow melts.     CGM:  Avg 166 SD 47.4  64% of the time in range.   Fairly flat line.   Peaks at 1100 and 2300.     Lowest readings pre-dinner.     Some nausea associated with headaches.   She has been taking zofran.   Usually 1-2 days after a trulicity injection.   She is willing to continue trulicity though for now and see if nausea and headaches improve with better BP control.     She has been having sensation of going low while at work and has to snack.     Some tingling in feet at night but does not want medication yet.     ROS: 10 point ROS neg other than the symptoms noted above in the HPI.    O:  Vital signs:  Temp: 96.9  F (36.1  C) Temp src: Oral BP: (!) 149/108 Pulse: 80   Resp: 14 SpO2: 98 %     Height: 175.3 cm (5' 9\") Weight: 101.1 kg (222 lb 12.8 " "oz)  Estimated body mass index is 32.9 kg/m  as calculated from the following:    Height as of this encounter: 1.753 m (5' 9\").    Weight as of this encounter: 101.1 kg (222 lb 12.8 oz).  Gen: In NAD.   HEENT: no proptosis or lid lag, EOMI, MMM.     A/P:   Type 2 DM - Uncontrolled. Discussed SGLT-2 inhibitors. ICR 4, sensitivity 13. She has become acclimated to running high and gets symptoms of hypoglycemia with readings in 150's.   In 2/2020, readings improved with changing to carb based dosing. She has been having sensation of going low while at work and has to snack.   -Reduce lantus to 60 units twice a day.   -Check HbA1C today.   -Increase exercise as tolerated.   -ASA not indicated.  -BP: elevated. Reports still elevated when checks on her own.    -Increase maxizde to 75-50 mg daily.    -Check your blood pressure in 2 weeks and let me know how you are doing.    -If this does not normalize blood pressure, we will try adding in Jardiance.   -Lipids: high triglycerides in 11/2019. Continue to work on DM control.   -Microalbumin 29.16 in 7/2019. Allergy to amlodipine, lisinopril and losartan.   -Eyes: reports normal exam in 2019.   -Smoking: none.     I spent 25 minutes with the patient. Greater than 50% of the time spent in . Risks/benefits/alternatives reviewed.     Harvey Bryant MD on 2/5/2020 at 10:38 AM            Again, thank you for allowing me to participate in the care of your patient.        Sincerely,        Harvey Bryant MD    "

## 2020-02-05 NOTE — PROGRESS NOTES
"S: Pt being seen in f/u for DM.  Previously seen by Dr Cummings:  \"1994. Diagnosed with GDM, age 14  During pregnancy, diet management without insulin  Delivered 9/1994, daughter birthweight 7#11oz     1995. Diagnosed with diabetes mellitus  Began insulin treatment, has taken since  Has also used diabetes pills as metformin, also glimeparide             Recalls having low BG on glimeparide in the past  Subsequent 5 more pregnancies though 2 MC, daughter [id number], son born 6/2005  Insulin meds have included Humalog, Novolog, Lantus     Had previously seen Dr. Franky Engel/[facility]  Treatment with Lantus daily and mealtime Novolog, metformin  Tried Bidureon- had hives, may have tried Trulicity- hives\"    Metformin 1000 mg BID  Lantus 63 U BID  Novolog 4U/15 grams plus 3U/50 sliding scale insulin  trulicity 0.75 mg once a week.     No regular exercise program.   Plans to start weights, boxing, and biking when the snow melts.     CGM:  Avg 166 SD 47.4  64% of the time in range.   Fairly flat line.   Peaks at 1100 and 2300.     Lowest readings pre-dinner.     Some nausea associated with headaches.   She has been taking zofran.   Usually 1-2 days after a trulicity injection.   She is willing to continue trulicity though for now and see if nausea and headaches improve with better BP control.     She has been having sensation of going low while at work and has to snack.     Some tingling in feet at night but does not want medication yet.     ROS: 10 point ROS neg other than the symptoms noted above in the HPI.    O:  Vital signs:  Temp: 96.9  F (36.1  C) Temp src: Oral BP: (!) 149/108 Pulse: 80   Resp: 14 SpO2: 98 %     Height: 175.3 cm (5' 9\") Weight: 101.1 kg (222 lb 12.8 oz)  Estimated body mass index is 32.9 kg/m  as calculated from the following:    Height as of this encounter: 1.753 m (5' 9\").    Weight as of this encounter: 101.1 kg (222 lb 12.8 oz).  Gen: In NAD.   HEENT: no proptosis or lid lag, EOMI, MMM.     A/P: "   Type 2 DM - Uncontrolled. Discussed SGLT-2 inhibitors. ICR 4, sensitivity 13. She has become acclimated to running high and gets symptoms of hypoglycemia with readings in 150's.   In 2/2020, readings improved with changing to carb based dosing. She has been having sensation of going low while at work and has to snack.   -Reduce lantus to 60 units twice a day.   -Check HbA1C today.   -Increase exercise as tolerated.   -ASA not indicated.  -BP: elevated. Reports still elevated when checks on her own.    -Increase maxizde to 75-50 mg daily.    -Check your blood pressure in 2 weeks and let me know how you are doing.    -If this does not normalize blood pressure, we will try adding in Jardiance.   -Lipids: high triglycerides in 11/2019. Continue to work on DM control.   -Microalbumin 29.16 in 7/2019. Allergy to amlodipine, lisinopril and losartan.   -Eyes: reports normal exam in 2019.   -Smoking: none.     I spent 25 minutes with the patient. Greater than 50% of the time spent in . Risks/benefits/alternatives reviewed.     Harvey Bryant MD on 2/5/2020 at 10:38 AM

## 2020-02-05 NOTE — PATIENT INSTRUCTIONS
-Increase maxizde to 75-50 mg daily.   -Check your blood pressure in 2 weeks and let me know how you are doing.   -If this does not normalize blood pressure, we will try adding in Jardiance.     -Reduce lantus to 60 units twice a day. If you still feel like you are going low and need to eat, we can reduce this more.   -Check HbA1C today.   -Increase exercise as tolerated.

## 2020-02-06 LAB
ANION GAP SERPL CALCULATED.3IONS-SCNC: 6 MMOL/L (ref 3–14)
BUN SERPL-MCNC: 16 MG/DL (ref 7–30)
CALCIUM SERPL-MCNC: 9.6 MG/DL (ref 8.5–10.1)
CHLORIDE SERPL-SCNC: 100 MMOL/L (ref 94–109)
CO2 SERPL-SCNC: 26 MMOL/L (ref 20–32)
CREAT SERPL-MCNC: 1 MG/DL (ref 0.52–1.04)
GFR SERPL CREATININE-BSD FRML MDRD: 70 ML/MIN/{1.73_M2}
GLUCOSE SERPL-MCNC: 178 MG/DL (ref 70–99)
POTASSIUM SERPL-SCNC: 3.7 MMOL/L (ref 3.4–5.3)
SODIUM SERPL-SCNC: 132 MMOL/L (ref 133–144)

## 2020-04-03 ENCOUNTER — TELEPHONE (OUTPATIENT)
Dept: ENDOCRINOLOGY | Facility: CLINIC | Age: 40
End: 2020-04-03

## 2020-04-03 DIAGNOSIS — E11.9 TYPE 2 DIABETES MELLITUS WITHOUT COMPLICATION, WITH LONG-TERM CURRENT USE OF INSULIN (H): Primary | ICD-10-CM

## 2020-04-03 DIAGNOSIS — Z79.4 TYPE 2 DIABETES MELLITUS WITHOUT COMPLICATION, WITH LONG-TERM CURRENT USE OF INSULIN (H): Primary | ICD-10-CM

## 2020-04-03 NOTE — TELEPHONE ENCOUNTER
"Patient was last seen on 2/5/20 for type 2 diabetes and is calling reporting her feet have been bothering her at night. States \"only way I can describe it is like if you are at the beach and you stand on rocks bare foot, that's what it feels like.\" Wondering if she can get a prescription for gabapentin. Please advise.    Jose R Chaparro RN....4/3/2020 11:49 AM     "

## 2020-04-06 ENCOUNTER — TELEPHONE (OUTPATIENT)
Dept: ENDOCRINOLOGY | Facility: CLINIC | Age: 40
End: 2020-04-06

## 2020-04-06 DIAGNOSIS — Z79.4 TYPE 2 DIABETES MELLITUS WITHOUT COMPLICATION, WITH LONG-TERM CURRENT USE OF INSULIN (H): Primary | ICD-10-CM

## 2020-04-06 DIAGNOSIS — E11.9 TYPE 2 DIABETES MELLITUS WITHOUT COMPLICATION, WITH LONG-TERM CURRENT USE OF INSULIN (H): Primary | ICD-10-CM

## 2020-04-06 RX ORDER — INSULIN GLARGINE 100 [IU]/ML
60 INJECTION, SOLUTION SUBCUTANEOUS 2 TIMES DAILY
Qty: 45 ML | Refills: 11 | Status: SHIPPED | OUTPATIENT
Start: 2020-04-06

## 2020-04-06 RX ORDER — GABAPENTIN 100 MG/1
100 CAPSULE ORAL 3 TIMES DAILY
Qty: 90 CAPSULE | Refills: 11 | Status: SHIPPED | OUTPATIENT
Start: 2020-04-06 | End: 2020-12-02

## 2020-04-06 NOTE — TELEPHONE ENCOUNTER
Patient called pharmacy asking for a new prescription stating that she takes Lantus solostar 60 units twice daily.     Thank you,  Yen Canela, PharmD  [facility]  [phone]

## 2020-04-21 ENCOUNTER — TELEPHONE (OUTPATIENT)
Dept: ENDOCRINOLOGY | Facility: CLINIC | Age: 40
End: 2020-04-21

## 2020-04-21 DIAGNOSIS — Z79.4 TYPE 2 DIABETES MELLITUS WITHOUT COMPLICATION, WITH LONG-TERM CURRENT USE OF INSULIN (H): Primary | ICD-10-CM

## 2020-04-21 DIAGNOSIS — E11.9 TYPE 2 DIABETES MELLITUS WITHOUT COMPLICATION, WITH LONG-TERM CURRENT USE OF INSULIN (H): Primary | ICD-10-CM

## 2020-04-21 RX ORDER — METFORMIN HCL 500 MG
1000 TABLET, EXTENDED RELEASE 24 HR ORAL 2 TIMES DAILY WITH MEALS
Qty: 360 TABLET | Refills: 3 | Status: SHIPPED | OUTPATIENT
Start: 2020-04-21 | End: 2021-04-07

## 2020-05-01 ENCOUNTER — TRANSFERRED RECORDS (OUTPATIENT)
Dept: PHYSICAL THERAPY | Facility: CLINIC | Age: 40
End: 2020-05-01

## 2020-10-27 DIAGNOSIS — Z79.4 TYPE 2 DIABETES MELLITUS WITHOUT COMPLICATION, WITH LONG-TERM CURRENT USE OF INSULIN (H): ICD-10-CM

## 2020-10-27 DIAGNOSIS — E11.9 TYPE 2 DIABETES MELLITUS WITHOUT COMPLICATION, WITH LONG-TERM CURRENT USE OF INSULIN (H): ICD-10-CM

## 2020-10-27 RX ORDER — DULAGLUTIDE 0.75 MG/.5ML
INJECTION, SOLUTION SUBCUTANEOUS
Qty: 6 ML | Refills: 3 | Status: SHIPPED | OUTPATIENT
Start: 2020-10-27 | End: 2021-11-17

## 2020-12-02 ENCOUNTER — VIRTUAL VISIT (OUTPATIENT)
Dept: ENDOCRINOLOGY | Facility: CLINIC | Age: 40
End: 2020-12-02
Payer: COMMERCIAL

## 2020-12-02 DIAGNOSIS — Z79.4 TYPE 2 DIABETES MELLITUS WITHOUT COMPLICATION, WITH LONG-TERM CURRENT USE OF INSULIN (H): Primary | ICD-10-CM

## 2020-12-02 DIAGNOSIS — E11.9 TYPE 2 DIABETES MELLITUS WITHOUT COMPLICATION, WITH LONG-TERM CURRENT USE OF INSULIN (H): Primary | ICD-10-CM

## 2020-12-02 DIAGNOSIS — R80.9 ALBUMINURIA: ICD-10-CM

## 2020-12-02 DIAGNOSIS — I10 ESSENTIAL HYPERTENSION: ICD-10-CM

## 2020-12-02 PROCEDURE — 99214 OFFICE O/P EST MOD 30 MIN: CPT | Mod: TEL | Performed by: INTERNAL MEDICINE

## 2020-12-02 RX ORDER — GABAPENTIN 100 MG/1
200 CAPSULE ORAL 2 TIMES DAILY
Qty: 360 CAPSULE | Refills: 3 | Status: SHIPPED | OUTPATIENT
Start: 2020-12-02

## 2020-12-02 RX ORDER — CARVEDILOL 6.25 MG/1
TABLET ORAL 2 TIMES DAILY WITH MEALS
COMMUNITY
Start: 2020-10-27

## 2020-12-02 NOTE — LETTER
"    12/2/2020         RE: Laura Sexton  [address]  [facility] [address]        Dear Colleague,    Thank you for referring your patient, Laura Sexton, to the [facility]. Please see a copy of my visit note below.    Laura Sexton is a 40 year old female who is being evaluated via a billable telephone visit.      The patient has been notified of following:     \"This telephone visit will be conducted via a call between you and your physician/provider. We have found that certain health care needs can be provided without the need for a physical exam.  This service lets us provide the care you need with a short phone conversation.  If a prescription is necessary we can send it directly to your pharmacy.  If lab work is needed we can place an order for that and you can then stop by our lab to have the test done at a later time.    Telephone visits are billed at different rates depending on your insurance coverage. During this emergency period, for some insurers they may be billed the same as an in-person visit.  Please reach out to your insurance provider with any questions.    If during the course of the call the physician/provider feels a telephone visit is not appropriate, you will not be charged for this service.\"    Patient has given verbal consent for Telephone visit?  Yes    What phone number would you like to be contacted at? [phone]    How would you like to obtain your AVS? Mail a copy    S:   Pt being seen in f/u for DM.  Previously seen by Dr Cummings:  \"1994. Diagnosed with GDM, age 14  During pregnancy, diet management without insulin  Delivered 9/1994, daughter birthweight 7#11oz     1995. Diagnosed with diabetes mellitus  Began insulin treatment, has taken since  Has also used diabetes pills as metformin, also glimeparide             Recalls having low BG on glimeparide in the past  Subsequent 5 more pregnancies though 2 , daughter [id number], son born " "6/2005  Insulin meds have included Humalog, Novolog, Lantus     Had previously seen Dr. Franky Engel/[facility]  Treatment with Lantus daily and mealtime Novolog, metformin  Tried Bidureon- had hives, may have tried Trulicity- hives\"    Metformin 1000 mg BID  Lantus 60 U BID  Novolog 4U/15 grams plus 3U/50 sliding scale insulin  trulicity 0.75 mg once a week.     CGM:  Avg 228 over the last 30 days.     Sometimes misses meal boluses.   She lost her job due to cut back from COVID. Starting new job next week.   Lots of stress and putting her care on the back burner.   When she does \"everything I am supposed to\", she will have lows around 1700.     Again she has become frustrated that her BP medications have not seemed to help.   Explained that often times high doses of multiple medications are needed.     Gabapentin 200 mg BID is helping her neuropathy.     ROS: 10 point ROS neg other than the symptoms noted above in the HPI.    O:  Gen: In NAD.     A/P:   Type 2 DM - Uncontrolled. Discussed SGLT-2 inhibitors. ICR 4, sensitivity 13. She has become acclimated to running high and gets symptoms of hypoglycemia with readings in 150's.   In 2/2020, readings improved with changing to carb based dosing. She has been having sensation of going low while at work and has to snack.   In 12/2020, given HTN discussed jardiance. Also discussed increasing trulicity dose. Running low when she takes her medications as prescribed. Admits that she feels she is not taking medications as prescribed in part to avoid lows.   -Check if your metformin XR is on the recall list or not.  -Lower morning insulin from 60 to 50 Units.   -Schedule labs.   -Find the USB cable for the [facility] and set up a [facility] View account. Once you do, let me know and we can connect to you if you give us your email address to find the account.   -Increase exercise as tolerated.   -ASA not indicated.  -BP: elevated.    -Screen for hyperaldosteronism.   -Lipids: high " triglycerides in 11/2019. Continue to work on DM control.   Repeat due.    -Microalbumin 217.67 in 11/2019. Allergy to amlodipine, lisinopril and losartan.    Referred to Nephrology. Dr Fuentes.    They have been managing her HTN.    -Eyes: reports normal exam in 2019. Due for exam.   -Smoking: none.     Due to the COVID 19 pandemic this visit was a telephone/video visit in order to help prevent spread of infection in this high risk patient and the general population. The patient gave verbal consent for the visit today.    Start time 1400  Stop time 1425  Total time 25  This visit would have been billed as 99502 as an E & M code     Harvey Bryant MD on 12/2/2020 at 2:25 PM                      Again, thank you for allowing me to participate in the care of your patient.        Sincerely,        Harvey Bryant MD

## 2021-01-19 ENCOUNTER — TELEPHONE (OUTPATIENT)
Dept: ENDOCRINOLOGY | Facility: CLINIC | Age: 41
End: 2021-01-19

## 2021-01-19 DIAGNOSIS — Z79.4 TYPE 2 DIABETES MELLITUS WITHOUT COMPLICATION, WITH LONG-TERM CURRENT USE OF INSULIN (H): ICD-10-CM

## 2021-01-19 DIAGNOSIS — E11.9 TYPE 2 DIABETES MELLITUS WITHOUT COMPLICATION, WITH LONG-TERM CURRENT USE OF INSULIN (H): ICD-10-CM

## 2021-01-19 RX ORDER — FLASH GLUCOSE SENSOR
KIT MISCELLANEOUS
Qty: 6 EACH | Refills: 3 | Status: SHIPPED | OUTPATIENT
Start: 2021-01-19 | End: 2022-03-31

## 2021-01-19 NOTE — TELEPHONE ENCOUNTER
Prior Authorization Retail Medication Request    Medication/Dose: freestyle andres sensors require a prior auth  ICD code (if different than what is on RX):  E119  Previously Tried and Failed:    Rationale:      Insurance Name: [employer]   Insurance ID:  [id number]      Pharmacy Information (if different than what is on RX)  Name:  [facility]  Phone:  [phone]      Please note that patient has new insurance as of 01/01/21

## 2021-01-21 NOTE — TELEPHONE ENCOUNTER
Central Prior Authorization Team   Phone: [phone]    PA Initiation    Medication: freestyle andres sensors require a prior auth  Insurance Company: [employer] - Phone [phone] Fax [phone]  Pharmacy Filling the Rx: [place] [facility] - [address] [facility]  Filling Pharmacy Phone: [phone]  Filling Pharmacy Fax: [phone]  Start Date: 1/21/2021

## 2021-01-22 NOTE — TELEPHONE ENCOUNTER
Prior Authorization Approval    Authorization Effective Date: 12/23/2020  Authorization Expiration Date: 1/22/2024  Medication: freestyle andres sensors-APPROVED  Approved Dose/Quantity:    Reference #:     Insurance Company: [employer] - Phone [phone] Fax [phone]  Expected CoPay:       CoPay Card Available:      Foundation Assistance Needed:    Which Pharmacy is filling the prescription (Not needed for infusion/clinic administered): [facility] [place], [place] - [address]  Pharmacy Notified: Yes  Patient Notified: Yes  **Instructed pharmacy to notify patient when script is ready to /ship.**

## 2021-02-11 ENCOUNTER — TELEPHONE (OUTPATIENT)
Dept: ENDOCRINOLOGY | Facility: CLINIC | Age: 41
End: 2021-02-11

## 2021-02-11 NOTE — TELEPHONE ENCOUNTER
In the process of reviewing statin use in diabetic patients, noticed patient not  Using a statin. [facility] spoke with patient via telephone and she indicated she is trying to control cholesterol without adding another medication. Can we add 'statin intentionally not prescribed' to her med list?  Thank you  Tere Toscano [facility]  [address] [place] [address]  [place] [address]  [phone]

## 2021-02-15 ENCOUNTER — TELEPHONE (OUTPATIENT)
Dept: ENDOCRINOLOGY | Facility: CLINIC | Age: 41
End: 2021-02-15

## 2021-02-15 DIAGNOSIS — E11.29 TYPE 2 DIABETES MELLITUS WITH OTHER KIDNEY COMPLICATION, UNSPECIFIED WHETHER LONG TERM INSULIN USE (H): Primary | ICD-10-CM

## 2021-02-15 NOTE — TELEPHONE ENCOUNTER
"Statin therapy is recommended for patients 40-75 years of age with a diagnosis of diabetes and low-dose aspirin therapy is recommended in those with diabetes and a history of atherosclerotic cardiovascular disease.  Does patient have a diagnosis of diabetes? Yes - Patient has a diagnosis of diabetes  Is patient prescribed a statin? No - Patient is NOT prescribed a statin  Statin Assessment: Statin intentionally NOT prescribed  The ASCVD Risk score (Marisabel CAICEDO Jr., et al., 2013) failed to calculate for the following reasons:    The systolic blood pressure is missing   Statin Recommendation: Add \"statin intentionally not prescribed\" to medication list  Reason for Recommendation: n/a  Aspirin Assessment:  NA - Not Applicable  Aspirin Recomendation: NA - Not Applicable    Reason for Recommendation: n/a  Is provider follow-up required? yes Provider follow-up is NOT required  Is pharmacist follow-up required? No - Pharmacist follow-up is NOT required  Follow-Up Plan: patient is being monitored with labs, she declines statin use and presriber is aware  Patient outreach completed by:   Tere Toscano [facility]  [address] [place] [address]  [place] [address]  [phone]  "

## 2021-04-06 DIAGNOSIS — Z79.4 TYPE 2 DIABETES MELLITUS WITHOUT COMPLICATION, WITH LONG-TERM CURRENT USE OF INSULIN (H): ICD-10-CM

## 2021-04-06 DIAGNOSIS — E11.9 TYPE 2 DIABETES MELLITUS WITHOUT COMPLICATION, WITH LONG-TERM CURRENT USE OF INSULIN (H): ICD-10-CM

## 2021-04-07 RX ORDER — METFORMIN HCL 500 MG
TABLET, EXTENDED RELEASE 24 HR ORAL
Qty: 360 TABLET | Refills: 3 | Status: SHIPPED | OUTPATIENT
Start: 2021-04-07

## 2021-11-17 DIAGNOSIS — Z79.4 TYPE 2 DIABETES MELLITUS WITHOUT COMPLICATION, WITH LONG-TERM CURRENT USE OF INSULIN (H): ICD-10-CM

## 2021-11-17 DIAGNOSIS — E11.9 TYPE 2 DIABETES MELLITUS WITHOUT COMPLICATION, WITH LONG-TERM CURRENT USE OF INSULIN (H): ICD-10-CM

## 2021-11-17 RX ORDER — DULAGLUTIDE 0.75 MG/.5ML
INJECTION, SOLUTION SUBCUTANEOUS
Qty: 6 ML | Refills: 3 | Status: SHIPPED | OUTPATIENT
Start: 2021-11-17

## 2021-11-17 NOTE — TELEPHONE ENCOUNTER
Patient is stating that she is suppose to be on Metformin ER 1000mg twice daily.     I only have record of immediate release form, I would need a new prescription.     Thank you,  Yen Canela, PharmD  [facility]  [phone]     FAMILY HISTORY:  FH: HTN (hypertension)

## 2021-11-17 NOTE — TELEPHONE ENCOUNTER
Routing refill request to provider for review/approval because:  Labs not current:  several  Patient needs to be seen because:  Last OV on 12/2/2020, labs were ordered but not completed    Endo MA- please call patient to schedule follow up appointment    Blanca Simon RN, BSN [facility]

## 2022-03-31 DIAGNOSIS — Z79.4 TYPE 2 DIABETES MELLITUS WITHOUT COMPLICATION, WITH LONG-TERM CURRENT USE OF INSULIN (H): ICD-10-CM

## 2022-03-31 DIAGNOSIS — E11.9 TYPE 2 DIABETES MELLITUS WITHOUT COMPLICATION, WITH LONG-TERM CURRENT USE OF INSULIN (H): ICD-10-CM

## 2022-03-31 RX ORDER — FLASH GLUCOSE SENSOR
KIT MISCELLANEOUS
Qty: 6 EACH | Refills: 0 | Status: SHIPPED | OUTPATIENT
Start: 2022-03-31

## 2022-03-31 NOTE — TELEPHONE ENCOUNTER
Continuous Blood Gluc Sensor (FREESTYLE BARON 14 DAY SENSOR) [facility]    [place]2/2020  [facility] Harvey Grigsby MD    Endocrinology, Diabetes, and Metabolism      Nv: none